# Patient Record
Sex: FEMALE | Race: BLACK OR AFRICAN AMERICAN | NOT HISPANIC OR LATINO | Employment: UNEMPLOYED | URBAN - METROPOLITAN AREA
[De-identification: names, ages, dates, MRNs, and addresses within clinical notes are randomized per-mention and may not be internally consistent; named-entity substitution may affect disease eponyms.]

---

## 2017-08-30 ENCOUNTER — OFFICE VISIT (OUTPATIENT)
Dept: RETAIL CLINIC | Facility: CLINIC | Age: 27
End: 2017-08-30

## 2017-08-30 DIAGNOSIS — Z02.83 ENCOUNTER FOR DRUG SCREENING: Primary | ICD-10-CM

## 2018-01-06 ENCOUNTER — OFFICE VISIT (OUTPATIENT)
Dept: RETAIL CLINIC | Facility: CLINIC | Age: 28
End: 2018-01-06

## 2018-01-06 VITALS
TEMPERATURE: 99.5 F | DIASTOLIC BLOOD PRESSURE: 78 MMHG | SYSTOLIC BLOOD PRESSURE: 106 MMHG | BODY MASS INDEX: 22.33 KG/M2 | OXYGEN SATURATION: 98 % | HEART RATE: 98 BPM | WEIGHT: 134 LBS | RESPIRATION RATE: 18 BRPM | HEIGHT: 65 IN

## 2018-01-06 DIAGNOSIS — N89.8 VAGINAL IRRITATION: Primary | ICD-10-CM

## 2018-01-06 DIAGNOSIS — R30.0 DYSURIA: ICD-10-CM

## 2018-01-06 LAB
BILIRUB BLD-MCNC: NEGATIVE MG/DL
CLARITY, POC: CLEAR
COLOR UR: YELLOW
GLUCOSE UR STRIP-MCNC: NEGATIVE MG/DL
KETONES UR QL: ABNORMAL
LEUKOCYTE EST, POC: NEGATIVE
NITRITE UR-MCNC: NEGATIVE MG/ML
PH UR: 5.5 [PH] (ref 5–8)
PROT UR STRIP-MCNC: ABNORMAL MG/DL
RBC # UR STRIP: ABNORMAL /UL
SP GR UR: 1.03 (ref 1–1.03)
UROBILINOGEN UR QL: NORMAL

## 2018-01-06 PROCEDURE — 81003 URINALYSIS AUTO W/O SCOPE: CPT | Performed by: NURSE PRACTITIONER

## 2018-01-06 PROCEDURE — 99213 OFFICE O/P EST LOW 20 MIN: CPT | Performed by: NURSE PRACTITIONER

## 2018-01-06 RX ORDER — FEXOFENADINE HYDROCHLORIDE 60 MG/1
60 TABLET, FILM COATED ORAL DAILY
COMMUNITY

## 2018-01-06 RX ORDER — MOMETASONE FUROATE 50 UG/1
2 SPRAY, METERED NASAL DAILY
COMMUNITY
End: 2018-09-04 | Stop reason: ALTCHOICE

## 2018-01-06 NOTE — PROGRESS NOTES
"Subjective   Montez Talamantes is a 27 y.o. female. Started antibiotic for sinus infection yesterday.    CHIEF COMPLAINT  Urinary Tract Infection      Urinary Tract Infection    This is a new problem. Episode onset: 2 days ago, states she gets \"UTI\" whenever she shaves pubic hair (no change in sexual activity, bathing habits with shaving); has noted blood on toilet tissue since shaving yesterday. The problem has been gradually worsening. Pertinent negatives include no chills, discharge, flank pain, frequency, hematuria, hesitancy, nausea, possible pregnancy, sweats, urgency or vomiting. Treatments tried: antifungal cream. The treatment provided significant relief.       The following portions of the patient's history were reviewed and updated as appropriate: allergies, current mediations, past family history, past medical history, past social history, past surgical history, and problem list.    Review of Systems   Constitutional: Negative for chills, fatigue and fever.   Gastrointestinal: Negative for nausea and vomiting.   Genitourinary: Positive for dysuria. Negative for difficulty urinating, dyspareunia, flank pain, frequency, hematuria, hesitancy, urgency, vaginal bleeding, vaginal discharge and vaginal pain.   Neurological: Negative for dizziness, light-headedness and headaches.         Objective   No Known Allergies      /78  Pulse 98  Temp 99.5 °F (37.5 °C) (Temporal Artery )   Resp 18  Ht 165.1 cm (65\")  Wt 60.8 kg (134 lb)  LMP 12/31/2017 (Exact Date)  SpO2 98%  Breastfeeding? No  BMI 22.3 kg/m2    Physical Exam   Constitutional: She is oriented to person, place, and time. She appears well-developed and well-nourished. No distress.   HENT:   Head: Normocephalic.   Nose: Nose normal.   Eyes: Conjunctivae are normal.   Cardiovascular: Normal rate, regular rhythm and normal heart sounds.    Pulmonary/Chest: Effort normal and breath sounds normal.   Abdominal: Soft. Bowel sounds are normal. She " exhibits no distension. There is no tenderness. There is no CVA tenderness.   Neurological: She is alert and oriented to person, place, and time.       Assessment/Plan   Montez was seen today for urinary tract infection.    Diagnoses and all orders for this visit:    Vaginal irritation        - No sexual activity or pubic hair grooming until source of irritation confirmed        - Continue using monistat cream, per package directions        -  Cotton underpants, no tight pants        -  Keep area clean and dry    Dysuria  -     POCT urinalysis dipstick, automated: negative  -     Urine Culture - Urine, Urine, Clean Catch: will update patient when results available.  - Drink plenty of clear, non-caffeinated fluids  - No bubble baths, douche       An After Visit Summary was printed, reviewed, and given to the patient. Understanding verbalized and agrees with treatment plan.  If no improvement or becomes worse, follow up with primary or go to [facility].        January 6, 2018  3:30 PM

## 2018-01-06 NOTE — PATIENT INSTRUCTIONS
Vaginal yeast infection is a condition that causes soreness, swelling, and redness (inflammation) of the vagina. It also causes vaginal discharge. This is a common condition. Some women get this infection frequently.  CAUSES  This condition is caused by a change in the normal balance of the yeast (candida) and bacteria that live in the vagina. This change causes an overgrowth of yeast, which causes the inflammation.  RISK FACTORS  This condition is more likely to develop in:  · Women who take antibiotic medicines.  · Women who have diabetes.  · Women who take birth control pills.  · Women who are pregnant.  · Women who douche often.  · Women who have a weak defense (immune) system.  · Women who have been taking steroid medicines for a long time.  · Women who frequently wear tight clothing.  SYMPTOMS  Symptoms of this condition include:  · White, thick vaginal discharge.  · Swelling, itching, redness, and irritation of the vagina. The lips of the vagina (vulva) may be affected as well.  · Pain or a burning feeling while urinating.  · Pain during sex.  DIAGNOSIS  This condition is diagnosed with a medical history and physical exam. This will include a pelvic exam. Your health care provider will examine a sample of your vaginal discharge under a microscope. Your health care provider may send this sample for testing to confirm the diagnosis.  TREATMENT   This condition is treated with medicine. Medicines may be over-the-counter or prescription. You may be told to use one or more of the following:  · Medicine that is taken orally.  · Medicine that is applied as a cream.  · Medicine that is inserted directly into the vagina (suppository).  HOME CARE INSTRUCTIONS  · Take or apply over-the-counter and prescription medicines only as told by your health care provider.  · Do not have sex until your health care provider has approved. Tell your sex partner that you have a yeast infection. That person should go to his or her  health care provider if he or she develops symptoms.  · Do not wear tight clothes, such as pantyhose or tight pants.  · Avoid using tampons until your health care provider approves.  · Eat more yogurt. This may help to keep your yeast infection from returning.  · Try taking a sitz bath to help with discomfort. This is a warm water bath that is taken while you are sitting down. The water should only come up to your hips and should cover your buttocks. Do this 3-4 times per day or as told by your health care provider.  · Do not douche.  · Wear breathable, cotton underwear.  · If you have diabetes, keep your blood sugar levels under control.  SEEK MEDICAL CARE IF:  · You have a fever.  · Your symptoms go away and then return.  · Your symptoms do not get better with treatment.  · Your symptoms get worse.  · You have new symptoms.  · You develop blisters in or around your vagina.  · You have blood coming from your vagina and it is not your menstrual period.  · You develop pain in your abdomen.     This information is not intended to replace advice given to you by your health care provider. Make sure you discuss any questions you have with your health care provider.     Document Released: 09/27/2006 Document Revised: 04/10/2017 Document Reviewed: 06/20/2016  [employer] Interactive Patient Education ©2017 [employer]

## 2018-01-10 LAB
BACTERIA UR CULT: NORMAL
BACTERIA UR CULT: NORMAL

## 2018-09-04 ENCOUNTER — OFFICE VISIT (OUTPATIENT)
Dept: RETAIL CLINIC | Facility: CLINIC | Age: 28
End: 2018-09-04

## 2018-09-04 VITALS
WEIGHT: 136 LBS | DIASTOLIC BLOOD PRESSURE: 72 MMHG | SYSTOLIC BLOOD PRESSURE: 130 MMHG | HEART RATE: 78 BPM | BODY MASS INDEX: 22.66 KG/M2 | HEIGHT: 65 IN | TEMPERATURE: 98.1 F | OXYGEN SATURATION: 98 %

## 2018-09-04 DIAGNOSIS — J06.9 UPPER RESPIRATORY TRACT INFECTION, UNSPECIFIED TYPE: Primary | ICD-10-CM

## 2018-09-04 PROCEDURE — 99213 OFFICE O/P EST LOW 20 MIN: CPT | Performed by: NURSE PRACTITIONER

## 2018-09-04 RX ORDER — FLUTICASONE PROPIONATE 50 MCG
2 SPRAY, SUSPENSION (ML) NASAL DAILY
Qty: 1 BOTTLE | Refills: 1 | Status: SHIPPED | OUTPATIENT
Start: 2018-09-04 | End: 2020-02-24

## 2018-09-04 RX ORDER — PSEUDOEPHEDRINE HCL 120 MG/1
120 TABLET, FILM COATED, EXTENDED RELEASE ORAL EVERY 12 HOURS
Qty: 20 TABLET | Refills: 0 | Status: SHIPPED | OUTPATIENT
Start: 2018-09-04 | End: 2019-05-25

## 2018-09-04 RX ORDER — METHYLPREDNISOLONE 4 MG/1
TABLET ORAL
Qty: 21 TABLET | Refills: 0 | Status: SHIPPED | OUTPATIENT
Start: 2018-09-04 | End: 2019-05-25

## 2018-09-04 RX ORDER — DEXTROMETHORPHAN HYDROBROMIDE AND PROMETHAZINE HYDROCHLORIDE 15; 6.25 MG/5ML; MG/5ML
5 SYRUP ORAL 4 TIMES DAILY PRN
Qty: 118 ML | Refills: 0 | Status: SHIPPED | OUTPATIENT
Start: 2018-09-04 | End: 2019-05-25

## 2018-09-04 NOTE — PATIENT INSTRUCTIONS

## 2018-09-04 NOTE — PROGRESS NOTES
[email or web address]@  Montez Talamantes is a 27 y.o. female.   Chief Complaint   Patient presents with   • Cough   • Sore Throat      History of Present Illness   Montez presents with sore throat, post nasal drainage, productive cough and nasal congestion present for over a week. She is without fever or sinus pain. Her cough is productive with tan phlegm.She is taking antihistamines daily and is using a cold decongestant.   The following portions of the patient's history were reviewed and updated as appropriate: allergies, current medications, past family history, past medical history, past social history, past surgical history and problem list.    Current Outpatient Prescriptions:   •  fexofenadine (ALLEGRA) 60 MG tablet, Take 60 mg by mouth Daily., Disp: , Rfl:   •  fluticasone (FLONASE) 50 MCG/ACT nasal spray, 2 sprays into the nostril(s) as directed by provider Daily., Disp: 1 bottle, Rfl: 1  •  MethylPREDNISolone (MEDROL, VASQUEZ,) 4 MG tablet, Take as directed on package instructions., Disp: 21 tablet, Rfl: 0  •  promethazine-dextromethorphan (PROMETHAZINE-DM) 6.25-15 MG/5ML syrup, Take 5 mL by mouth 4 (Four) Times a Day As Needed for Cough., Disp: 118 mL, Rfl: 0  •  pseudoephedrine (SUDAFED 12 HOUR) 120 MG 12 hr tablet, Take 1 tablet by mouth Every 12 (Twelve) Hours., Disp: 20 tablet, Rfl: 0    No Known Allergies    Review of Systems   Constitutional: Negative.    HENT: Positive for congestion, postnasal drip, rhinorrhea and sore throat. Negative for ear pain, sinus pain, sinus pressure, trouble swallowing and voice change.    Eyes: Negative.    Respiratory: Positive for cough. Negative for shortness of breath and wheezing.    Cardiovascular: Negative.    Gastrointestinal: Negative.    Musculoskeletal: Negative.    Skin: Negative.    Allergic/Immunologic: Positive for environmental allergies.   Neurological: Negative.    Psychiatric/Behavioral: Negative.        Objective     Visit Vitals  /72   Pulse 78   Temp 98.1  "°F (36.7 °C)   Ht 165.1 cm (65\")   Wt 61.7 kg (136 lb)   SpO2 98%   BMI 22.63 kg/m²         Physical Exam   Constitutional: She is oriented to person, place, and time. Vital signs are normal. She appears well-developed and well-nourished. She is cooperative.  Non-toxic appearance. She does not have a sickly appearance. She does not appear ill. No distress.   HENT:   Head: Normocephalic and atraumatic.   Right Ear: Hearing, tympanic membrane, external ear and ear canal normal.   Left Ear: Hearing, tympanic membrane, external ear and ear canal normal.   Nose: Mucosal edema present. Right sinus exhibits no maxillary sinus tenderness and no frontal sinus tenderness. Left sinus exhibits no maxillary sinus tenderness and no frontal sinus tenderness.   Mouth/Throat: Oropharynx is clear and moist and mucous membranes are normal. No oropharyngeal exudate, posterior oropharyngeal edema or posterior oropharyngeal erythema. Tonsils are 0 on the right. Tonsils are 0 on the left. No tonsillar exudate.   Eyes: Conjunctivae are normal.   Neck: Normal range of motion. Neck supple. No thyromegaly present.   Cardiovascular: Normal rate, regular rhythm and normal heart sounds.    No murmur heard.  Pulmonary/Chest: Effort normal and breath sounds normal. No respiratory distress. She has no wheezes. She has no rales.   Abdominal: Bowel sounds are normal.   Neurological: She is alert and oriented to person, place, and time.   Skin: Skin is warm and dry. No rash noted.   Psychiatric: She has a normal mood and affect. Her behavior is normal. Judgment and thought content normal.   Vitals reviewed.      Lab Results (last 24 hours)     ** No results found for the last 24 hours. **          Assessment/Plan   Montez was seen today for cough and sore throat.    Diagnoses and all orders for this visit:    Upper respiratory tract infection, unspecified type  -     fluticasone (FLONASE) 50 MCG/ACT nasal spray; 2 sprays into the nostril(s) as " directed by provider Daily.  -     pseudoephedrine (SUDAFED 12 HOUR) 120 MG 12 hr tablet; Take 1 tablet by mouth Every 12 (Twelve) Hours.  -     promethazine-dextromethorphan (PROMETHAZINE-DM) 6.25-15 MG/5ML syrup; Take 5 mL by mouth 4 (Four) Times a Day As Needed for Cough.  -     MethylPREDNISolone (MEDROL, VASQUEZ,) 4 MG tablet; Take as directed on package instructions.    Rosie Millard, APRN

## 2019-05-25 ENCOUNTER — OFFICE VISIT (OUTPATIENT)
Dept: RETAIL CLINIC | Facility: CLINIC | Age: 29
End: 2019-05-25

## 2019-05-25 VITALS
BODY MASS INDEX: 24.59 KG/M2 | OXYGEN SATURATION: 98 % | HEART RATE: 103 BPM | WEIGHT: 144 LBS | TEMPERATURE: 98.4 F | SYSTOLIC BLOOD PRESSURE: 120 MMHG | HEIGHT: 64 IN | RESPIRATION RATE: 20 BRPM | DIASTOLIC BLOOD PRESSURE: 68 MMHG

## 2019-05-25 DIAGNOSIS — N89.8 VAGINAL IRRITATION: ICD-10-CM

## 2019-05-25 DIAGNOSIS — N39.0 URINARY TRACT INFECTION WITH HEMATURIA, SITE UNSPECIFIED: Primary | ICD-10-CM

## 2019-05-25 DIAGNOSIS — R30.0 DYSURIA: ICD-10-CM

## 2019-05-25 DIAGNOSIS — R31.9 URINARY TRACT INFECTION WITH HEMATURIA, SITE UNSPECIFIED: Primary | ICD-10-CM

## 2019-05-25 LAB
BILIRUB BLD-MCNC: NEGATIVE MG/DL
CLARITY, POC: ABNORMAL
COLOR UR: YELLOW
GLUCOSE UR STRIP-MCNC: NEGATIVE MG/DL
KETONES UR QL: NEGATIVE
LEUKOCYTE EST, POC: ABNORMAL
NITRITE UR-MCNC: NEGATIVE MG/ML
PH UR: 1 [PH] (ref 5–8)
PROT UR STRIP-MCNC: NEGATIVE MG/DL
RBC # UR STRIP: ABNORMAL /UL
SP GR UR: 7 (ref 1–1.03)
UROBILINOGEN UR QL: NORMAL

## 2019-05-25 PROCEDURE — 99213 OFFICE O/P EST LOW 20 MIN: CPT | Performed by: NURSE PRACTITIONER

## 2019-05-25 PROCEDURE — 81003 URINALYSIS AUTO W/O SCOPE: CPT | Performed by: NURSE PRACTITIONER

## 2019-05-25 RX ORDER — NITROFURANTOIN 25; 75 MG/1; MG/1
100 CAPSULE ORAL 2 TIMES DAILY
Qty: 14 CAPSULE | Refills: 0 | Status: SHIPPED | OUTPATIENT
Start: 2019-05-25 | End: 2019-06-01

## 2019-05-25 RX ORDER — PETROLATUM,WHITE/LANOLIN
OINTMENT (GRAM) TOPICAL AS NEEDED
Qty: 56.7 G | Refills: 0 | Status: SHIPPED | OUTPATIENT
Start: 2019-05-25 | End: 2019-05-28

## 2019-05-25 RX ORDER — PHENAZOPYRIDINE HYDROCHLORIDE 100 MG/1
100 TABLET, FILM COATED ORAL 3 TIMES DAILY PRN
Qty: 6 TABLET | Refills: 0 | Status: SHIPPED | OUTPATIENT
Start: 2019-05-25 | End: 2019-05-27

## 2019-05-25 NOTE — PATIENT INSTRUCTIONS
Vaginitis  Vaginitis is irritation and swelling (inflammation) of the vagina. It happens when normal bacteria and yeast in the vagina grow too much. There are many types of this condition. Treatment will depend on the type you have.  Follow these instructions at home:  Lifestyle  · Keep your vagina area clean and dry.  ? Avoid using soap.  ? Rinse the area with water.  · Do not do the following until your doctor says it is okay:  ? Wash and clean out the vagina (douche).  ? Use tampons.  ? Have sex.  · Wipe from front to back after going to the bathroom.  · Let air reach your vagina.  ? Wear cotton underwear.  ? Do not wear:  ? Underwear while you sleep.  ? Tight pants.  ? Thong underwear.  ? Underwear or nylons without a cotton panel.  ? Take off any wet clothing, such as bathing suits, as soon as possible.  · Use gentle, non-scented products. Do not use things that can irritate the vagina, such as fabric softeners. Avoid the following products if they are scented:  ? Feminine sprays.  ? Detergents.  ? Tampons.  ? Feminine hygiene products.  ? Soaps or bubble baths.  · Practice safe sex and use condoms.  General instructions  · Take over-the-counter and prescription medicines only as told by your doctor.  · If you were prescribed an antibiotic medicine, take or use it as told by your doctor. Do not stop taking or using the antibiotic even if you start to feel better.  · Keep all follow-up visits as told by your doctor. This is important.  Contact a doctor if:  · You have pain in your belly.  · You have a fever.  · Your symptoms last for more than 2-3 days.  Get help right away if:  · You have a fever and your symptoms get worse all of a sudden.  Summary  · Vaginitis is irritation and swelling of the vagina. It can happen when the normal bacteria and yeast in the vagina grow too much. There are many types.  · Treatment will depend on the type you have.  · Do not douche, use tampons , or have sex until your health  care provider approves. When you can return to sex, practice safe sex and use condoms.  This information is not intended to replace advice given to you by your health care provider. Make sure you discuss any questions you have with your health care provider.  Document Released: 03/16/2010 Document Revised: 01/09/2018 Document Reviewed: 01/09/2018  [employer] Interactive Patient Education © 2019 [employer]  Urinary Tract Infection, Adult  A urinary tract infection (UTI) is an infection of any part of the urinary tract. The urinary tract includes the:  · Kidneys.  · Ureters.  · Bladder.  · Urethra.    These organs make, store, and get rid of pee (urine) in the body.  Follow these instructions at home:  · Take over-the-counter and prescription medicines only as told by your doctor.  · If you were prescribed an antibiotic medicine, take it as told by your doctor. Do not stop taking the antibiotic even if you start to feel better.  · Avoid the following drinks:  ? Alcohol.  ? Caffeine.  ? Tea.  ? Carbonated drinks.  · Drink enough fluid to keep your pee clear or pale yellow.  · Keep all follow-up visits as told by your doctor. This is important.  · Make sure to:  ? Empty your bladder often and completely. Do not to hold pee for long periods of time.  ? Empty your bladder before and after sex.  ? Wipe from front to back after a bowel movement if you are female. Use each tissue one time when you wipe.  Contact a doctor if:  · You have back pain.  · You have a fever.  · You feel sick to your stomach (nauseous).  · You throw up (vomit).  · Your symptoms do not get better after 3 days.  · Your symptoms go away and then come back.  Get help right away if:  · You have very bad back pain.  · You have very bad lower belly (abdominal) pain.  · You are throwing up and cannot keep down any medicines or water.  This information is not intended to replace advice given to you by your health care provider. Make sure you discuss any  questions you have with your health care provider.  Document Released: 06/05/2009 Document Revised: 06/12/2018 Document Reviewed: 11/07/2016  [employer] Interactive Patient Education © 2019 [employer]

## 2019-05-25 NOTE — PROGRESS NOTES
ARACELI@  Montez Talamantes is a 28 y.o. female.   Chief Complaint   Patient presents with   • Urinary Tract Infection      Montez states she started her period yesterday, and bought new pads.  She didn't realize they were scented and by the time she took the first on off last night she was having pain with frequent urination.  She is now using unscented pads.      Urinary Tract Infection    This is a new problem. The current episode started yesterday. The problem occurs every urination. The problem has been gradually worsening. There has been no fever. She is sexually active. There is no history of pyelonephritis. Associated symptoms include frequency and urgency. Pertinent negatives include no chills, discharge, flank pain, hesitancy, nausea, possible pregnancy, sweats or vomiting. She has tried nothing for the symptoms.        The following portions of the patient's history were reviewed and updated as appropriate: allergies, current medications, past family history, past medical history, past social history, past surgical history and problem list.    Current Outpatient Medications:   •  fexofenadine (ALLEGRA) 60 MG tablet, Take 60 mg by mouth Daily., Disp: , Rfl:   •  fluticasone (FLONASE) 50 MCG/ACT nasal spray, 2 sprays into the nostril(s) as directed by provider Daily., Disp: 1 bottle, Rfl: 1  •  nitrofurantoin, macrocrystal-monohydrate, (MACROBID) 100 MG capsule, Take 1 capsule by mouth 2 (Two) Times a Day for 7 days., Disp: 14 capsule, Rfl: 0  •  phenazopyridine (PYRIDIUM) 100 MG tablet, Take 1 tablet by mouth 3 (Three) Times a Day As Needed for bladder spasms for up to 2 days., Disp: 6 tablet, Rfl: 0  •  Vitamins A & D (VITAMIN A & D) ointment, Apply  topically to the appropriate area as directed As Needed for Dry Skin for up to 3 days., Disp: 56.7 g, Rfl: 0    Allergies   Allergen Reactions   • Spironolactone Swelling       Review of Systems   Constitutional: Negative for appetite change, chills,  "fatigue and fever.   HENT: Negative for sneezing and sore throat.    Respiratory: Negative for cough.    Gastrointestinal: Negative for diarrhea, nausea and vomiting.   Genitourinary: Positive for difficulty urinating, dysuria, frequency and urgency. Negative for decreased urine volume, dyspareunia, flank pain, genital sores, hesitancy, pelvic pain and vaginal discharge.        Currently menstruating, states labia are tender & believes may be due to irritation caused by scented pad   Musculoskeletal: Negative for myalgias.   Skin: Negative for rash.   Neurological: Negative for dizziness and headaches.       Objective     Visit Vitals  /68   Pulse 103   Temp 98.4 °F (36.9 °C) (Temporal)   Resp 20   Ht 162.6 cm (64\")   Wt 65.3 kg (144 lb)   LMP 05/25/2019   SpO2 98%   BMI 24.72 kg/m²         Physical Exam   Constitutional: She is oriented to person, place, and time. She appears well-developed and well-nourished. She does not appear ill. No distress.   HENT:   Head: Normocephalic.   Right Ear: External ear normal.   Left Ear: External ear normal.   Eyes: Conjunctivae are normal.   Cardiovascular: Regular rhythm and normal heart sounds. Tachycardia present.   Pulmonary/Chest: Effort normal and breath sounds normal.   Abdominal: Soft. There is no tenderness. There is no CVA tenderness.   Lymphadenopathy:     She has no cervical adenopathy.   Neurological: She is alert and oriented to person, place, and time.       Lab Results (last 24 hours)     Procedure Component Value Units Date/Time    POCT urinalysis dipstick, automated [[id number]]  (Abnormal) Collected:  05/25/19 1602    Specimen:  Urine Updated:  05/25/19 1603     Color Yellow     Clarity, UA Hazy     Specific Gravity  7.000     pH, Urine 1.0     Leukocytes Large (3+)     Nitrite, UA Negative     Protein, POC Negative mg/dL      Glucose, UA Negative mg/dL      Ketones, UA Negative     Urobilinogen, UA Normal     Bilirubin Negative     Blood, UA Large     " Comment: menstruating             Assessment/Plan   Montez was seen today for urinary tract infection.    Diagnoses and all orders for this visit:    Urinary tract infection with hematuria, site unspecified  -     nitrofurantoin, macrocrystal-monohydrate, (MACROBID) 100 MG capsule; Take 1 capsule by mouth 2 (Two) Times a Day for 7 days.  -     phenazopyridine (PYRIDIUM) 100 MG tablet; Take 1 tablet by mouth 3 (Three) Times a Day As Needed for bladder spasms for up to 2 days.  - Drink plenty of clear, decaffeinated fluids, as tolerated.  - Acetaminophen or ibuprofen, per package directions, as needed for mild pain  - Wipe from front to back, no harsh soaps or scented products, wash hands before and after toileting, no bubble baths  - Loose cotton underpants and natural fiber pants  - Keep area clean and dry    Dysuria  -     POCT urinalysis dipstick, automated    Vaginal irritation  -     Vitamins A & D (VITAMIN A & D) ointment; Apply  topically to the appropriate area as directed As Needed for Dry Skin for up to 3 days.    An After Visit Summary was reviewed, printed and given to the patient.  Understanding verbalized and patient agreed with treatment plan.  If symptom(s) worsen or fail to improve, return to clinic, follow up with PCP or go to [facility].

## 2019-08-28 ENCOUNTER — OFFICE VISIT (OUTPATIENT)
Dept: RETAIL CLINIC | Facility: CLINIC | Age: 29
End: 2019-08-28

## 2019-08-28 VITALS
BODY MASS INDEX: 23.17 KG/M2 | RESPIRATION RATE: 20 BRPM | OXYGEN SATURATION: 100 % | WEIGHT: 135 LBS | HEART RATE: 80 BPM | TEMPERATURE: 98 F

## 2019-08-28 DIAGNOSIS — B37.31 VAGINAL YEAST INFECTION: Primary | ICD-10-CM

## 2019-08-28 LAB
BILIRUB BLD-MCNC: NEGATIVE MG/DL
CLARITY, POC: CLEAR
COLOR UR: YELLOW
GLUCOSE UR STRIP-MCNC: NEGATIVE MG/DL
KETONES UR QL: NEGATIVE
LEUKOCYTE EST, POC: ABNORMAL
NITRITE UR-MCNC: NEGATIVE MG/ML
PH UR: 7.5 [PH] (ref 5–8)
PROT UR STRIP-MCNC: NEGATIVE MG/DL
RBC # UR STRIP: NEGATIVE /UL
SP GR UR: 1.02 (ref 1–1.03)
UROBILINOGEN UR QL: NORMAL

## 2019-08-28 PROCEDURE — 81003 URINALYSIS AUTO W/O SCOPE: CPT | Performed by: NURSE PRACTITIONER

## 2019-08-28 PROCEDURE — 99213 OFFICE O/P EST LOW 20 MIN: CPT | Performed by: NURSE PRACTITIONER

## 2019-08-28 RX ORDER — FLUCONAZOLE 150 MG/1
150 TABLET ORAL ONCE
Qty: 3 TABLET | Refills: 0 | Status: SHIPPED | OUTPATIENT
Start: 2019-08-28 | End: 2019-08-28

## 2019-08-28 NOTE — PATIENT INSTRUCTIONS
Vaginal Yeast infection, Adult    Vaginal yeast infection is a condition that causes soreness, swelling, and redness (inflammation) of the vagina. It also causes vaginal discharge. This is a common condition. Some women get this infection frequently.  What are the causes?  This condition is caused by a change in the normal balance of the yeast (candida) and bacteria that live in the vagina. This change causes an overgrowth of yeast, which causes the inflammation.  What increases the risk?  This condition is more likely to develop in:  · Women who take antibiotic medicines.  · Women who have diabetes.  · Women who take birth control pills.  · Women who are pregnant.  · Women who douche often.  · Women who have a weak defense (immune) system.  · Women who have been taking steroid medicines for a long time.  · Women who frequently wear tight clothing.  What are the signs or symptoms?  Symptoms of this condition include:  · White, thick vaginal discharge.  · Swelling, itching, redness, and irritation of the vagina. The lips of the vagina (vulva) may be affected as well.  · Pain or a burning feeling while urinating.  · Pain during sex.  How is this diagnosed?  This condition is diagnosed with a medical history and physical exam. This will include a pelvic exam. Your health care provider will examine a sample of your vaginal discharge under a microscope. Your health care provider may send this sample for testing to confirm the diagnosis.  How is this treated?  This condition is treated with medicine. Medicines may be over-the-counter or prescription. You may be told to use one or more of the following:  · Medicine that is taken orally.  · Medicine that is applied as a cream.  · Medicine that is inserted directly into the vagina (suppository).  Follow these instructions at home:    · Take or apply over-the-counter and prescription medicines only as told by your health care provider.  · Do not have sex until your health  care provider has approved. Tell your sex partner that you have a yeast infection. That person should go to his or her health care provider if he or she develops symptoms.  · Do not wear tight clothes, such as pantyhose or tight pants.  · Avoid using tampons until your health care provider approves.  · Eat more yogurt. This may help to keep your yeast infection from returning.  · Try taking a sitz bath to help with discomfort. This is a warm water bath that is taken while you are sitting down. The water should only come up to your hips and should cover your buttocks. Do this 3-4 times per day or as told by your health care provider.  · Do not douche.  · Wear breathable, cotton underwear.  · If you have diabetes, keep your blood sugar levels under control.  Contact a health care provider if:  · You have a fever.  · Your symptoms go away and then return.  · Your symptoms do not get better with treatment.  · Your symptoms get worse.  · You have new symptoms.  · You develop blisters in or around your vagina.  · You have blood coming from your vagina and it is not your menstrual period.  · You develop pain in your abdomen.  This information is not intended to replace advice given to you by your health care provider. Make sure you discuss any questions you have with your health care provider.  Document Released: 09/27/2006 Document Revised: 04/05/2018 Document Reviewed: 06/20/2016  [employer] Interactive Patient Education © 2019 [employer]

## 2019-08-28 NOTE — PROGRESS NOTES
Subjective   Montez Talamantes is a 28 y.o. female.     She notes mild vaginal itching past 2 days. Has mild white discharge. No concern for STD. She denies urinary frequency or urgency.       Vaginitis   This is a new problem. The current episode started yesterday. Pertinent negatives include no abdominal pain, anorexia, arthralgias, change in bowel habit, chest pain, chills, congestion, coughing, diaphoresis, fatigue, fever, headaches, joint swelling, myalgias, nausea, neck pain, numbness, rash, sore throat, swollen glands, urinary symptoms, vertigo, visual change, vomiting or weakness. Nothing aggravates the symptoms. She has tried nothing for the symptoms.        The following portions of the patient's history were reviewed and updated as appropriate: allergies, current medications, past family history, past medical history, past social history, past surgical history and problem list.    Review of Systems   Constitutional: Negative for chills, diaphoresis, fatigue and fever.   HENT: Negative for congestion and sore throat.    Respiratory: Negative for cough.    Cardiovascular: Negative for chest pain.   Gastrointestinal: Negative for abdominal pain, anorexia, change in bowel habit, nausea and vomiting.   Genitourinary: Positive for vaginal discharge. Negative for decreased urine volume, difficulty urinating, dyspareunia, dysuria, flank pain, frequency, genital sores, hematuria, menstrual problem, pelvic pain, urgency and vaginal bleeding.   Musculoskeletal: Negative for arthralgias, joint swelling, myalgias and neck pain.   Skin: Negative for rash.   Neurological: Negative for vertigo, weakness, numbness and headaches.   All other systems reviewed and are negative.    Pulse 80   Temp 98 °F (36.7 °C)   Resp 20   Wt 61.2 kg (135 lb)   SpO2 100%   BMI 23.17 kg/m²    Objective   Physical Exam   Constitutional: She appears well-developed and well-nourished.   Eyes: Pupils are equal, round, and reactive to light.    Musculoskeletal: Normal range of motion.   Skin: Skin is warm.   Psychiatric: She has a normal mood and affect.   Nursing note and vitals reviewed.         Assessment/Plan   Montez was seen today for vaginitis.    Diagnoses and all orders for this visit:    Vaginal yeast infection  -     POCT urinalysis dipstick, automated    Other orders  -     fluconazole (DIFLUCAN) 150 MG tablet; Take 1 tablet by mouth 1 (One) Time for 1 dose. Can repeat as directed daily x 2                 FRANSISCA Concepcion

## 2020-02-24 ENCOUNTER — OFFICE VISIT (OUTPATIENT)
Dept: RETAIL CLINIC | Facility: CLINIC | Age: 30
End: 2020-02-24

## 2020-02-24 ENCOUNTER — RESULTS ENCOUNTER (OUTPATIENT)
Dept: RETAIL CLINIC | Facility: CLINIC | Age: 30
End: 2020-02-24

## 2020-02-24 VITALS
RESPIRATION RATE: 18 BRPM | WEIGHT: 145.2 LBS | HEART RATE: 77 BPM | DIASTOLIC BLOOD PRESSURE: 62 MMHG | OXYGEN SATURATION: 99 % | HEIGHT: 65 IN | BODY MASS INDEX: 24.19 KG/M2 | TEMPERATURE: 97.6 F | SYSTOLIC BLOOD PRESSURE: 118 MMHG

## 2020-02-24 DIAGNOSIS — R35.0 FREQUENCY OF URINATION: ICD-10-CM

## 2020-02-24 DIAGNOSIS — N30.01 ACUTE CYSTITIS WITH HEMATURIA: ICD-10-CM

## 2020-02-24 DIAGNOSIS — J02.9 SORE THROAT: ICD-10-CM

## 2020-02-24 DIAGNOSIS — J02.9 ACUTE PHARYNGITIS, UNSPECIFIED ETIOLOGY: ICD-10-CM

## 2020-02-24 LAB
BILIRUB BLD-MCNC: NEGATIVE MG/DL
CLARITY, POC: CLEAR
COLOR UR: YELLOW
EXPIRATION DATE: NORMAL
GLUCOSE UR STRIP-MCNC: NEGATIVE MG/DL
INTERNAL CONTROL: NORMAL
KETONES UR QL: NEGATIVE
LEUKOCYTE EST, POC: ABNORMAL
Lab: NORMAL
NITRITE UR-MCNC: NEGATIVE MG/ML
PH UR: 6.5 [PH] (ref 5–8)
PROT UR STRIP-MCNC: NEGATIVE MG/DL
RBC # UR STRIP: ABNORMAL /UL
S PYO AG THROAT QL: NEGATIVE
SP GR UR: 1.01 (ref 1–1.03)
UROBILINOGEN UR QL: NORMAL

## 2020-02-24 PROCEDURE — 99213 OFFICE O/P EST LOW 20 MIN: CPT | Performed by: NURSE PRACTITIONER

## 2020-02-24 PROCEDURE — 87880 STREP A ASSAY W/OPTIC: CPT | Performed by: NURSE PRACTITIONER

## 2020-02-24 PROCEDURE — 81003 URINALYSIS AUTO W/O SCOPE: CPT | Performed by: NURSE PRACTITIONER

## 2020-02-24 RX ORDER — NITROFURANTOIN 25; 75 MG/1; MG/1
100 CAPSULE ORAL 2 TIMES DAILY
Qty: 10 CAPSULE | Refills: 0 | Status: SHIPPED | OUTPATIENT
Start: 2020-02-24 | End: 2020-02-29

## 2020-02-24 NOTE — PROGRESS NOTES
Sore Throat and Urinary Tract Infection      Subjective   Montez Talamantes is a 29 y.o. female.     Sore Throat    This is a new problem. The current episode started today. The problem has been unchanged. Neither side of throat is experiencing more pain than the other. There has been no fever. The pain is mild. Associated symptoms include congestion (nasal). Pertinent negatives include no abdominal pain, coughing, diarrhea, drooling, ear discharge, ear pain, headaches, hoarse voice, plugged ear sensation, neck pain, shortness of breath, stridor, swollen glands, trouble swallowing or vomiting. She has had no exposure to strep or mono. Treatments tried: Aspirin. The treatment provided no relief.   Urinary Tract Infection    This is a new problem. The current episode started today. The problem occurs every urination. The problem has been unchanged. The pain is at a severity of 0/10. The patient is experiencing no pain. There has been no fever. She is not sexually active. There is no history of pyelonephritis. Associated symptoms include frequency and urgency. Pertinent negatives include no chills, discharge, flank pain, hematuria, hesitancy, nausea, possible pregnancy, sweats or vomiting. She has tried increased fluids for the symptoms. The treatment provided no relief. Her past medical history is significant for recurrent UTIs (3 months ago last UTI). There is no history of catheterization, kidney stones, a single kidney, urinary stasis or a urological procedure.        There is no problem list on file for this patient.      Allergies   Allergen Reactions   • Spironolactone Swelling        Current Outpatient Medications on File Prior to Visit   Medication Sig Dispense Refill   • fexofenadine (ALLEGRA) 60 MG tablet Take 60 mg by mouth Daily.     • [DISCONTINUED] fluticasone (FLONASE) 50 MCG/ACT nasal spray 2 sprays into the nostril(s) as directed by provider Daily. 1 bottle 1     No current facility-administered  medications on file prior to visit.        Past Medical History:   Diagnosis Date   • Allergic    • Urinary tract infection        Past Surgical History:   Procedure Laterality Date   • STOMACH SURGERY         Family History   Problem Relation Age of Onset   • No Known Problems Mother    • No Known Problems Father        Social History     Socioeconomic History   • Marital status: Single     Spouse name: Not on file   • Number of children: Not on file   • Years of education: Not on file   • Highest education level: Not on file   Tobacco Use   • Smoking status: Never Smoker   • Smokeless tobacco: Never Used   Substance and Sexual Activity   • Alcohol use: Yes     Comment: social   • Drug use: No   • Sexual activity: Yes     Partners: Male     Birth control/protection: None       Travel:  No recent travel within the last 21 days outside the [place] Denies recent travel to one of the following [place]ries:  [place], [place], [place], or [place].  Denies contact with anyone who has traveled to one of the following [place]  Countries: [place], [place], [place], or [place] within the last 21 days and is known or suspected to have Ebola.  Denies having had any contact with the human remains, blood or any bodily fluids of someone who is known or suspected to have Ebola within the last 21 days.     OB History    None         Review of Systems   Constitutional: Negative for activity change, appetite change, chills, diaphoresis, fatigue and fever.   HENT: Positive for congestion (nasal) and sore throat. Negative for drooling, ear discharge, ear pain, hoarse voice, postnasal drip, rhinorrhea, sinus pressure, sinus pain, sneezing, tinnitus, trouble swallowing and voice change.    Respiratory: Negative for cough, shortness of breath and stridor.    Gastrointestinal: Negative for abdominal pain, diarrhea, nausea and vomiting.   Genitourinary: Positive for frequency and urgency. Negative for decreased urine volume,  "dysuria, enuresis, flank pain, genital sores, hematuria, hesitancy, menstrual problem, pelvic pain, vaginal bleeding, vaginal discharge and vaginal pain.   Musculoskeletal: Positive for back pain (low). Negative for myalgias and neck pain.   Skin: Negative for rash.   Neurological: Negative for headaches.   All other systems reviewed and are negative.      /62 (BP Location: Left arm, Patient Position: Sitting, Cuff Size: Adult)   Pulse 77   Temp 97.6 °F (36.4 °C) (Oral)   Resp 18   Ht 165.1 cm (65\")   Wt 65.9 kg (145 lb 3.2 oz)   LMP 02/16/2020   SpO2 99%   Breastfeeding No   BMI 24.16 kg/m²     Objective   Physical Exam   Constitutional: She is oriented to person, place, and time. She appears well-developed and well-nourished. No distress.   HENT:   Head: Normocephalic and atraumatic.   Right Ear: Hearing, tympanic membrane, external ear and ear canal normal.   Left Ear: Hearing, tympanic membrane, external ear and ear canal normal.   Nose: Nose normal. No mucosal edema or rhinorrhea.   Mouth/Throat: Uvula is midline and mucous membranes are normal. Posterior oropharyngeal erythema present. No oropharyngeal exudate, posterior oropharyngeal edema or tonsillar abscesses. Tonsils are 0 on the right. Tonsils are 0 on the left. No tonsillar exudate.   Eyes: Pupils are equal, round, and reactive to light. Conjunctivae are normal. Right eye exhibits no discharge. Left eye exhibits no discharge. No scleral icterus.   Neck: Normal range of motion. Neck supple.   Cardiovascular: Normal rate, regular rhythm and normal heart sounds.   Pulmonary/Chest: Effort normal and breath sounds normal. No respiratory distress. She has no wheezes. She has no rales.   Abdominal: Soft. Bowel sounds are normal. She exhibits no distension. There is no tenderness. There is no guarding and no CVA tenderness.   Musculoskeletal: Normal range of motion.   C/o low back pain; unable to reproduce pain with palpation   Lymphadenopathy: "     She has no cervical adenopathy.   Neurological: She is alert and oriented to person, place, and time.   Skin: Skin is warm and dry. No rash noted. She is not diaphoretic.   Psychiatric: She has a normal mood and affect. Her behavior is normal.   Vitals reviewed.      Assessment/Plan   Montez was seen today for sore throat and urinary tract infection.    Diagnoses and all orders for this visit:    Acute cystitis with hematuria  -     nitrofurantoin, macrocrystal-monohydrate, (MACROBID) 100 MG capsule; Take 1 capsule by mouth 2 (Two) Times a Day for 5 days.    Acute pharyngitis, unspecified etiology    Sore throat  -     POCT rapid strep A    Frequency of urination  -     POCT urinalysis dipstick, automated  -     Urine Culture - Urine, Urine, Clean Catch; Future    Plan of care discussed with patient: will send urine for culture and call in 3-4 days with results; take Tylenol or Motrin for pain; warm salt water gargles for throat pain; increase water intake; avoid caffeine; proper female hygiene discussed; follow up with PCP for no improvement in 2-3 days or sooner for new or worsening symptoms. Patient verbalized understanding.    Results for orders placed or performed in visit on 02/24/20   POCT rapid strep A   Result Value Ref Range    Rapid Strep A Screen Negative Negative, VALID, INVALID, Not Performed    Internal Control Passed Passed    Lot Number [id number]     Expiration Date 3/31/21    POCT urinalysis dipstick, automated   Result Value Ref Range    Color Yellow Yellow, Straw, Dark Yellow, Kiana    Clarity, UA Clear Clear    Specific Gravity  1.010 1.005 - 1.030    pH, Urine 6.5 5.0 - 8.0    Leukocytes Small (1+) (A) Negative    Nitrite, UA Negative Negative    Protein, POC Negative Negative mg/dL    Glucose, UA Negative Negative, 1000 mg/dL (3+) mg/dL    Ketones, UA Negative Negative    Urobilinogen, UA Normal Normal    Bilirubin Negative Negative    Blood, UA Trace (A) Negative       No follow-ups on  file.

## 2020-02-24 NOTE — PATIENT INSTRUCTIONS
Urinary Tract Infection, Adult  A urinary tract infection (UTI) is an infection of any part of the urinary tract. The urinary tract includes:  · The kidneys.  · The ureters.  · The bladder.  · The urethra.  These organs make, store, and get rid of pee (urine) in the body.  What are the causes?  This is caused by germs (bacteria) in your genital area. These germs grow and cause swelling (inflammation) of your urinary tract.  What increases the risk?  You are more likely to develop this condition if:  · You have a small, thin tube (catheter) to drain pee.  · You cannot control when you pee or poop (incontinence).  · You are female, and:  ? You use these methods to prevent pregnancy:  ? A medicine that kills sperm (spermicide).  ? A device that blocks sperm (diaphragm).  ? You have low levels of a female hormone (estrogen).  ? You are pregnant.  · You have genes that add to your risk.  · You are sexually active.  · You take antibiotic medicines.  · You have trouble peeing because of:  ? A prostate that is bigger than normal, if you are male.  ? A blockage in the part of your body that drains pee from the bladder (urethra).  ? A kidney stone.  ? A nerve condition that affects your bladder (neurogenic bladder).  ? Not getting enough to drink.  ? Not peeing often enough.  · You have other conditions, such as:  ? Diabetes.  ? A weak disease-fighting system (immune system).  ? Sickle cell disease.  ? Gout.  ? Injury of the spine.  What are the signs or symptoms?  Symptoms of this condition include:  · Needing to pee right away (urgently).  · Peeing often.  · Peeing small amounts often.  · Pain or burning when peeing.  · Blood in the pee.  · Pee that smells bad or not like normal.  · Trouble peeing.  · Pee that is cloudy.  · Fluid coming from the vagina, if you are female.  · Pain in the belly or lower back.  Other symptoms include:  · Throwing up (vomiting).  · No urge to eat.  · Feeling mixed up (confused).  · Being tired  and grouchy (irritable).  · A fever.  · Watery poop (diarrhea).  How is this treated?  This condition may be treated with:  · Antibiotic medicine.  · Other medicines.  · Drinking enough water.  Follow these instructions at home:    Medicines  · Take over-the-counter and prescription medicines only as told by your doctor.  · If you were prescribed an antibiotic medicine, take it as told by your doctor. Do not stop taking it even if you start to feel better.  General instructions  · Make sure you:  ? Pee until your bladder is empty.  ? Do not hold pee for a long time.  ? Empty your bladder after sex.  ? Wipe from front to back after pooping if you are a female. Use each tissue one time when you wipe.  · Drink enough fluid to keep your pee pale yellow.  · Keep all follow-up visits as told by your doctor. This is important.  Contact a doctor if:  · You do not get better after 1-2 days.  · Your symptoms go away and then come back.  Get help right away if:  · You have very bad back pain.  · You have very bad pain in your lower belly.  · You have a fever.  · You are sick to your stomach (nauseous).  · You are throwing up.  Summary  · A urinary tract infection (UTI) is an infection of any part of the urinary tract.  · This condition is caused by germs in your genital area.  · There are many risk factors for a UTI. These include having a small, thin tube to drain pee and not being able to control when you pee or poop.  · Treatment includes antibiotic medicines for germs.  · Drink enough fluid to keep your pee pale yellow.  This information is not intended to replace advice given to you by your health care provider. Make sure you discuss any questions you have with your health care provider.  Document Released: 06/05/2009 Document Revised: 12/05/2019 Document Reviewed: 06/27/2019  [employer] Interactive Patient Education © 2020 [employer]    Sore Throat  When you have a sore throat, your throat may  feel:  · Tender.  · Burning.  · Irritated.  · Scratchy.  · Painful when you swallow.  · Painful when you talk.  Many things can cause a sore throat, such as:  · An infection.  · Allergies.  · Dry air.  · Smoke or pollution.  · Radiation treatment.  · Gastroesophageal reflux disease (GERD).  · A tumor.  A sore throat can be the first sign of another sickness. It can happen with other problems, like:  · Coughing.  · Sneezing.  · Fever.  · Swelling in the neck.  Most sore throats go away without treatment.  Follow these instructions at home:         · Take over-the-counter medicines only as told by your doctor.  ? If your child has a sore throat, do not give your child aspirin.  · Drink enough fluids to keep your pee (urine) pale yellow.  · Rest when you feel you need to.  · To help with pain:  ? Sip warm liquids, such as broth, herbal tea, or warm water.  ? Eat or drink cold or frozen liquids, such as frozen ice pops.  ? Gargle with a salt-water mixture 3-4 times a day or as needed. To make a salt-water mixture, add ½-1 tsp (3-6 g) of salt to 1 cup (237 mL) of warm water. Mix it until you cannot see the salt anymore.  ? Suck on hard candy or throat lozenges.  ? Put a cool-mist humidifier in your bedroom at night.  ? Sit in the bathroom with the door closed for 5-10 minutes while you run hot water in the shower.  · Do not use any products that contain nicotine or tobacco, such as cigarettes, e-cigarettes, and chewing tobacco. If you need help quitting, ask your doctor.  · Wash your hands well and often with soap and water. If soap and water are not available, use hand .  Contact a doctor if:  · You have a fever for more than 2-3 days.  · You keep having symptoms for more than 2-3 days.  · Your throat does not get better in 7 days.  · You have a fever and your symptoms suddenly get worse.  · Your child who is 3 months to 3 years old has a temperature of 102.2°F (39°C) or higher.  Get help right away if:  · You  have trouble breathing.  · You cannot swallow fluids, soft foods, or your saliva.  · You have swelling in your throat or neck that gets worse.  · You keep feeling sick to your stomach (nauseous).  · You keep throwing up (vomiting).  Summary  · A sore throat is pain, burning, irritation, or scratchiness in the throat. Many things can cause a sore throat.  · Take over-the-counter medicines only as told by your doctor. Do not give your child aspirin.  · Drink plenty of fluids, and rest as needed.  · Contact a doctor if your symptoms get worse or your sore throat does not get better within 7 days.  This information is not intended to replace advice given to you by your health care provider. Make sure you discuss any questions you have with your health care provider.  Document Released: 09/26/2009 Document Revised: 05/20/2019 Document Reviewed: 05/20/2019  [employer] Interactive Patient Education © 2020 [employer]

## 2020-02-26 LAB
BACTERIA UR CULT: NORMAL
BACTERIA UR CULT: NORMAL

## 2020-02-28 ENCOUNTER — TELEPHONE (OUTPATIENT)
Dept: RETAIL CLINIC | Facility: CLINIC | Age: 30
End: 2020-02-28

## 2020-08-24 ENCOUNTER — HOSPITAL ENCOUNTER (EMERGENCY)
Facility: HOSPITAL | Age: 30
Discharge: HOME | End: 2020-08-24
Attending: EMERGENCY MEDICINE | Admitting: EMERGENCY MEDICINE

## 2020-08-24 VITALS
DIASTOLIC BLOOD PRESSURE: 81 MMHG | WEIGHT: 147 LBS | HEART RATE: 83 BPM | HEIGHT: 65 IN | SYSTOLIC BLOOD PRESSURE: 136 MMHG | OXYGEN SATURATION: 100 % | RESPIRATION RATE: 18 BRPM | BODY MASS INDEX: 24.49 KG/M2 | TEMPERATURE: 98.4 F

## 2020-08-24 DIAGNOSIS — Z20.822 ENCOUNTER FOR LABORATORY TESTING FOR COVID-19 VIRUS: ICD-10-CM

## 2020-08-24 DIAGNOSIS — J02.9 PHARYNGITIS, UNSPECIFIED ETIOLOGY: Primary | ICD-10-CM

## 2020-08-24 LAB — S PYO AG THROAT QL: NEGATIVE

## 2020-08-24 PROCEDURE — 87081 CULTURE SCREEN ONLY: CPT | Performed by: EMERGENCY MEDICINE

## 2020-08-24 PROCEDURE — 87880 STREP A ASSAY W/OPTIC: CPT

## 2020-08-24 PROCEDURE — 99283 EMERGENCY DEPT VISIT LOW MDM: CPT

## 2020-08-24 PROCEDURE — U0004 COV-19 TEST NON-CDC HGH THRU: HCPCS | Performed by: PHYSICIAN ASSISTANT

## 2020-08-24 PROCEDURE — U0002 COVID-19 LAB TEST NON-CDC: HCPCS | Performed by: PHYSICIAN ASSISTANT

## 2020-08-24 RX ORDER — AMOXICILLIN 500 MG/1
500 CAPSULE ORAL 3 TIMES DAILY
Qty: 30 CAPSULE | Refills: 0 | Status: SHIPPED | OUTPATIENT
Start: 2020-08-24

## 2020-08-25 ENCOUNTER — EPISODE CHANGES (OUTPATIENT)
Dept: CASE MANAGEMENT | Facility: OTHER | Age: 30
End: 2020-08-25

## 2020-08-25 ENCOUNTER — TELEPHONE (OUTPATIENT)
Dept: EMERGENCY DEPT | Facility: HOSPITAL | Age: 30
End: 2020-08-25

## 2020-08-25 LAB
REF LAB TEST METHOD: NORMAL
SARS-COV-2 RNA RESP QL NAA+PROBE: NOT DETECTED

## 2020-08-25 NOTE — DISCHARGE INSTRUCTIONS
Rest.  Amoxicillin as prescribed.  Warm salt water gargles several times daily.  Tylenol or Motrin for pain and fever.  Quarantine at home until results of your COVID-19 test are back.  Follow-up with your PCP as needed.  Return to the ER if worse.

## 2020-08-25 NOTE — ED PROVIDER NOTES
Subjective   29-year-old female presents to the emergency department with complaints of sore throat.  The symptoms began 3 days ago.  The patient states that she had a low-grade fever yesterday.  No cough or shortness of breath.  No loss of taste or smell.  No known exposures to COVID.  The patient states that she has pain with swallowing and has had some loss of appetite.  No known health issues.  Her PCP is Radha Waller.  The patient states that she would like a COVID-19 test if possible.          Review of Systems   Constitutional: Positive for fever. Negative for chills.   HENT: Positive for sore throat.         No loss of taste or smell   Respiratory: Negative for cough and shortness of breath.    Cardiovascular: Negative for chest pain.   Gastrointestinal: Positive for nausea and vomiting. Negative for abdominal pain.   Genitourinary: Negative for dysuria.   Musculoskeletal: Negative for back pain.   Skin: Negative for rash.   Allergic/Immunologic: Negative for immunocompromised state.   Neurological: Negative for headaches.   Hematological: Negative.    Psychiatric/Behavioral: Negative.        Past Medical History:   Diagnosis Date   • Allergic    • Urinary tract infection        Allergies   Allergen Reactions   • Spironolactone Swelling       Past Surgical History:   Procedure Laterality Date   • STOMACH SURGERY         Family History   Problem Relation Age of Onset   • No Known Problems Mother    • No Known Problems Father        Social History     Socioeconomic History   • Marital status:      Spouse name: Not on file   • Number of children: Not on file   • Years of education: Not on file   • Highest education level: Not on file   Tobacco Use   • Smoking status: Never Smoker   • Smokeless tobacco: Never Used   Substance and Sexual Activity   • Alcohol use: Yes     Comment: social   • Drug use: No   • Sexual activity: Yes     Partners: Male     Birth control/protection: None           Objective    Physical Exam   Constitutional: She is oriented to person, place, and time. She appears well-developed and well-nourished. No distress.   HENT:   Head: Normocephalic.   Moderate pharyngeal erythema with white exudate.  No abscess.   Eyes: Pupils are equal, round, and reactive to light. Conjunctivae are normal.   Neck: Normal range of motion. Neck supple.   Cardiovascular: Normal rate, regular rhythm, normal heart sounds and intact distal pulses.   No murmur heard.  Pulmonary/Chest: Effort normal and breath sounds normal. No respiratory distress. She has no wheezes. She has no rales.   Abdominal: Soft. Bowel sounds are normal. There is no tenderness.   Musculoskeletal: Normal range of motion. She exhibits no edema or tenderness.   Lymphadenopathy:     She has no cervical adenopathy.   Neurological: She is alert and oriented to person, place, and time.   Skin: Skin is warm and dry. No rash noted.   Psychiatric: She has a normal mood and affect.       Procedures           ED Course        Rapid strep swab is negative.  The patient does have significant erythema with exudate.  No abscesses noted.  She wishes to have a COVID-19 test and I think this is reasonable.  I will plan to discharge her home on antibiotics and to have her self quarantine until her COVID test results are back.                                     MDM    Final diagnoses:   Pharyngitis, unspecified etiology   Encounter for laboratory testing for COVID-19 virus            Pedro Hodges, PA  08/24/20 [id number]

## 2020-08-26 ENCOUNTER — EPISODE CHANGES (OUTPATIENT)
Dept: CASE MANAGEMENT | Facility: OTHER | Age: 30
End: 2020-08-26

## 2020-08-26 LAB — BACTERIA SPEC AEROBE CULT: NORMAL
